# Patient Record
Sex: FEMALE | Race: WHITE | Employment: FULL TIME | URBAN - METROPOLITAN AREA
[De-identification: names, ages, dates, MRNs, and addresses within clinical notes are randomized per-mention and may not be internally consistent; named-entity substitution may affect disease eponyms.]

---

## 2022-01-12 ENCOUNTER — HOSPITAL ENCOUNTER (EMERGENCY)
Age: 47
Discharge: HOME | End: 2022-01-12
Attending: EMERGENCY MEDICINE
Payer: COMMERCIAL

## 2022-01-12 ENCOUNTER — APPOINTMENT (OUTPATIENT)
Dept: GENERAL RADIOLOGY | Age: 47
End: 2022-01-12
Payer: COMMERCIAL

## 2022-01-12 VITALS
SYSTOLIC BLOOD PRESSURE: 145 MMHG | WEIGHT: 165 LBS | HEART RATE: 94 BPM | RESPIRATION RATE: 18 BRPM | BODY MASS INDEX: 28.17 KG/M2 | TEMPERATURE: 98.4 F | DIASTOLIC BLOOD PRESSURE: 89 MMHG | OXYGEN SATURATION: 99 % | HEIGHT: 64 IN

## 2022-01-12 DIAGNOSIS — R52 PAIN: ICD-10-CM

## 2022-01-12 DIAGNOSIS — S43.014A ANTERIOR DISLOCATION OF SHOULDER, RIGHT, INITIAL ENCOUNTER: Primary | ICD-10-CM

## 2022-01-12 PROCEDURE — 96374 THER/PROPH/DIAG INJ IV PUSH: CPT

## 2022-01-12 PROCEDURE — 99285 EMERGENCY DEPT VISIT HI MDM: CPT

## 2022-01-12 PROCEDURE — 73030 X-RAY EXAM OF SHOULDER: CPT

## 2022-01-12 PROCEDURE — 23650 CLTX SHO DSLC W/MNPJ WO ANES: CPT

## 2022-01-12 PROCEDURE — 2500000003 HC RX 250 WO HCPCS: Performed by: STUDENT IN AN ORGANIZED HEALTH CARE EDUCATION/TRAINING PROGRAM

## 2022-01-12 PROCEDURE — 6370000000 HC RX 637 (ALT 250 FOR IP): Performed by: PHYSICIAN ASSISTANT

## 2022-01-12 PROCEDURE — 2580000003 HC RX 258: Performed by: STUDENT IN AN ORGANIZED HEALTH CARE EDUCATION/TRAINING PROGRAM

## 2022-01-12 RX ORDER — HYDROCODONE BITARTRATE AND ACETAMINOPHEN 5; 325 MG/1; MG/1
1 TABLET ORAL ONCE
Status: COMPLETED | OUTPATIENT
Start: 2022-01-12 | End: 2022-01-12

## 2022-01-12 RX ORDER — KETAMINE HYDROCHLORIDE 10 MG/ML
1.5 INJECTION, SOLUTION INTRAMUSCULAR; INTRAVENOUS ONCE
Status: COMPLETED | OUTPATIENT
Start: 2022-01-12 | End: 2022-01-12

## 2022-01-12 RX ORDER — HYDROCODONE BITARTRATE AND ACETAMINOPHEN 5; 325 MG/1; MG/1
1 TABLET ORAL EVERY 8 HOURS PRN
Qty: 9 TABLET | Refills: 0 | Status: SHIPPED | OUTPATIENT
Start: 2022-01-12 | End: 2022-01-15

## 2022-01-12 RX ORDER — 0.9 % SODIUM CHLORIDE 0.9 %
1000 INTRAVENOUS SOLUTION INTRAVENOUS ONCE
Status: COMPLETED | OUTPATIENT
Start: 2022-01-12 | End: 2022-01-12

## 2022-01-12 RX ORDER — ONDANSETRON 4 MG/1
4 TABLET, ORALLY DISINTEGRATING ORAL ONCE
Status: COMPLETED | OUTPATIENT
Start: 2022-01-12 | End: 2022-01-12

## 2022-01-12 RX ADMIN — SODIUM CHLORIDE 1000 ML: 9 INJECTION, SOLUTION INTRAVENOUS at 12:43

## 2022-01-12 RX ADMIN — ONDANSETRON 4 MG: 4 TABLET, ORALLY DISINTEGRATING ORAL at 11:05

## 2022-01-12 RX ADMIN — HYDROCODONE BITARTRATE AND ACETAMINOPHEN 1 TABLET: 5; 325 TABLET ORAL at 11:05

## 2022-01-12 RX ADMIN — KETAMINE HYDROCHLORIDE 112.2 MG: 10 INJECTION, SOLUTION INTRAMUSCULAR; INTRAVENOUS at 12:42

## 2022-01-12 ASSESSMENT — PAIN DESCRIPTION - ORIENTATION
ORIENTATION: RIGHT

## 2022-01-12 ASSESSMENT — PAIN DESCRIPTION - LOCATION
LOCATION: ARM
LOCATION: SHOULDER
LOCATION: SHOULDER

## 2022-01-12 ASSESSMENT — ENCOUNTER SYMPTOMS
EYE REDNESS: 0
VOMITING: 0
EYE DISCHARGE: 0
SORE THROAT: 0
SINUS PRESSURE: 0
BACK PAIN: 0
COUGH: 0
WHEEZING: 0
ABDOMINAL DISTENTION: 0
NAUSEA: 0
DIARRHEA: 0
SHORTNESS OF BREATH: 0
EYE PAIN: 0

## 2022-01-12 ASSESSMENT — PAIN DESCRIPTION - DESCRIPTORS
DESCRIPTORS: ACHING
DESCRIPTORS: ACHING
DESCRIPTORS: SHARP

## 2022-01-12 ASSESSMENT — PAIN DESCRIPTION - ONSET
ONSET: SUDDEN
ONSET: SUDDEN

## 2022-01-12 ASSESSMENT — PAIN DESCRIPTION - FREQUENCY
FREQUENCY: CONTINUOUS
FREQUENCY: CONTINUOUS
FREQUENCY: INTERMITTENT

## 2022-01-12 ASSESSMENT — PAIN SCALES - GENERAL
PAINLEVEL_OUTOF10: 0
PAINLEVEL_OUTOF10: 10
PAINLEVEL_OUTOF10: 0
PAINLEVEL_OUTOF10: 8

## 2022-01-12 ASSESSMENT — PAIN DESCRIPTION - PAIN TYPE
TYPE: ACUTE PAIN
TYPE: ACUTE PAIN

## 2022-01-12 ASSESSMENT — PAIN DESCRIPTION - PROGRESSION: CLINICAL_PROGRESSION: NOT CHANGED

## 2022-01-12 NOTE — ED NOTES
Bed: 20  Expected date:   Expected time:   Means of arrival:   Comments:  Super track     Loan Prakash RN  01/12/22 [id number]

## 2022-01-12 NOTE — ED TRIAGE NOTES
Department of Emergency Medicine  FIRST PROVIDER TRIAGE NOTE             Independent MLP           1/12/22  10:42 AM EST    Date of Encounter: 1/12/22   MRN: [id number]      HPI: Nai Marc is a 48 y.o. female who presents to the ED for Fall (Stepped on dog toy and fell with hand held out. Pain in right shoulder and elbow, denies hitting head or LOC)   right shoulder/elbow upper arm pain after fall today. Fell onto arm (NOT outstretched). ROS: Negative for cp, sob or headache. PE: Gen Appearance/Constitutional: alert  CV: regular rate  Pulm: CTA bilat     Initial Plan of Care: All treatment areas with department are currently occupied. Plan to order/Initiate the following while awaiting opening in ED: imaging studies.     Initial Plan of Care: Initiate Treatment-Testing, Proceed toTreatment Area When Bed Available for ED Attending/MLP to Continue Care    Electronically signed by ANGEL Arias   DD: 1/12/22

## 2022-01-12 NOTE — ED PROVIDER NOTES
ED Attending  CC: Katherin Waite [id number]  Department of Emergency Medicine   ED  Encounter Note  Admit Date/RoomTime: 2022 10:59 AM  ED Room:     NAME: Nellene Kayser  : 1975  MRN: [id number]     Chief Complaint:  Fall (Stepped on dog toy and fell with hand held out. Pain in right shoulder and elbow, denies hitting head or LOC)    History of Present Illness       Court Yury Cee is a 48 y.o. old female who presents to the emergency department by private vehicle, for right shoulder pain which occurred after a mechanical fall at 0900 this morning. Pt states she stepped on a dog toy which caused her to fall onto her right shoulder. Pt denies hitting her head, loc, or any other injury during the fall. Pt states her symptoms are mild in severity and describes it as an aching, sharp pain. Pt states the pain is worse when she tries to move it. Denies anything making it better. Pt is right hand dominant and denies previous injury to the shoulder. Denies fever/chills, HA, vision change, dizziness, cp, sob, abdominal pain, nvd, numbness/weakness. ROS   Pertinent positives and negatives are stated within HPI, all other systems reviewed and are negative. Past Medical History:  has no past medical history on file. Surgical History:  has no past surgical history on file. Social History:  reports that she has never smoked. She has never used smokeless tobacco.    Family History: family history is not on file. Allergies: Patient has no known allergies. Physical Exam   Oxygen Saturation Interpretation: Normal.        ED Triage Vitals   BP Temp Temp Source Pulse Resp SpO2 Height Weight   22 1046 22 1056 22 1056 22 1036 22 1046 22 1036 22 1056 22 1056   (!) 157/128 98.4 °F (36.9 °C) Oral 102 18 99 % 5' 4\" (1.626 m) 165 lb (74.8 kg)         Constitutional:  Alert, development consistent with age. Neck:  Normal ROM. Supple. Non-tender. Right Shoulder: anterior, posterior, lateral.              Tenderness: mild              Swelling: None. Deformity: dislocation deformity palpated. ROM: unable to test due to pain. FROM of digits. Skin:  no wounds, erythema, or swelling. Neurovascular: Motor deficit: none. Sensory deficit: none. Pulse deficit: none. Capillary refill: normal.    Right Elbow: diffusely across elbow            Tenderness:  none. Swelling: None. Deformity: no deformity observed/palpated. ROM: full painless ROM. Skin:  no wounds, erythema, or swelling. Lymphatics: No lymphangitis or edema noted  Neurological:  Oriented. Motor functions intact. Lab / Imaging Results   (All laboratory and radiology results have been personally reviewed by myself)  Labs:  No results found for this visit on 01/12/22. Imaging: All Radiology results interpreted by Radiologist unless otherwise noted. XR SHOULDER RIGHT (MIN 2 VIEWS)   Final Result   Anterior shoulder dislocation. ED Course / Medical Decision Making     Medications   0.9 % sodium chloride bolus (has no administration in time range)   ketamine (KETALAR) injection 112.2 mg (has no administration in time range)   ondansetron (ZOFRAN-ODT) disintegrating tablet 4 mg (4 mg Oral Given 1/12/22 1105)   HYDROcodone-acetaminophen (NORCO) 5-325 MG per tablet 1 tablet (1 tablet Oral Given 1/12/22 1105)       Consult(s):   None    Procedure(s):   See physician note. MDM:      Pt presenting after fall with anterior shoulder dislocation. Pt is in no acute distress, afebrile, nontoxic in appearance. Pt xray showing anterior dislocation. See separate procedure note.      Plan of Care/Counseling:  DEVANG Mathur reviewed today's visit with the patient in addition to providing specific details for the plan of care and counseling regarding the diagnosis and prognosis. Questions are answered at this time and are agreeable with the plan. Assessment      1. Anterior dislocation of shoulder, right, initial encounter      Plan   Discharged home. Patient condition is stable    New Medications     New Prescriptions    No medications on file     Electronically signed by Carlton Bermudez PA-C   DD: 1/12/22  **This report was transcribed using voice recognition software. Every effort was made to ensure accuracy; however, inadvertent computerized transcription errors may be present.   END OF ED PROVIDER NOTE        Carlton Bermudez PA-C  01/12/22 1195

## 2022-01-12 NOTE — ED PROVIDER NOTES
Chief Complaint   Patient presents with    Fall     Stepped on dog toy and fell with hand held out. Pain in right shoulder and elbow, denies hitting head or LOC       Patient is a 78-year-old female comes in after ground-level fall, she states she tripped on a dog toy falling on her right side. She does have pain to the right shoulder. She has decreased range of motion. Symptoms have been persistent and moderate in nature, are worsened with movement and relieved with rest.  She is returning medication at home for the symptoms. She denies hitting her head or loss of consciousness. She is alert and oriented. No history of previous fracture or injury to this area. The history is provided by the patient. No  was used. Review of Systems   Constitutional: Negative for chills and fever. HENT: Negative for ear pain, sinus pressure and sore throat. Eyes: Negative for pain, discharge and redness. Respiratory: Negative for cough, shortness of breath and wheezing. Cardiovascular: Negative for chest pain. Gastrointestinal: Negative for abdominal distention, diarrhea, nausea and vomiting. Genitourinary: Negative for dysuria and frequency. Musculoskeletal: Positive for arthralgias. Negative for back pain. Skin: Negative for rash and wound. Neurological: Negative for weakness and headaches. Hematological: Negative for adenopathy. All other systems reviewed and are negative. Physical Exam  Vitals and nursing note reviewed. Constitutional:       General: She is not in acute distress. Appearance: Normal appearance. She is well-developed. She is not ill-appearing. HENT:      Head: Normocephalic and atraumatic. Eyes:      Pupils: Pupils are equal, round, and reactive to light. Cardiovascular:      Rate and Rhythm: Normal rate and regular rhythm. Pulses: Normal pulses. Heart sounds: Normal heart sounds.       Comments: Radial pulses 2+ and strong in the upper extremities bilaterally  Pulmonary:      Effort: Pulmonary effort is normal. No respiratory distress. Breath sounds: Normal breath sounds. No wheezing or rales. Abdominal:      General: Bowel sounds are normal.      Palpations: Abdomen is soft. Tenderness: There is no abdominal tenderness. There is no guarding or rebound. Musculoskeletal:         General: Tenderness and deformity present. Cervical back: Normal range of motion and neck supple. Right lower leg: No edema. Left lower leg: No edema. Comments: Tenderness palpation of the right shoulder, obvious deformity, no skin tenting   Skin:     General: Skin is warm and dry. Capillary Refill: Capillary refill takes less than 2 seconds. Neurological:      General: No focal deficit present. Mental Status: She is alert and oriented to person, place, and time. Cranial Nerves: No cranial nerve deficit. Coordination: Coordination normal.      Comments: Sensation and muscle strength intact in the upper extremities bilaterally          Procedures     -------------------------------- Conscious Sedation Procedure Note -----------------------------  Patient Name: Santo Gibson   Medical Record Number: [id number]  Date: 1/12/22   Time: 12:11 PM EST   Room/Bed: 20/20    Start time:  12:35PM  Indication: shoulder dislocation  Consent: I have discussed with the patient and/or the patient representative the indication, alternatives, and the possible risks and/or complications of the planned procedure and the anesthesia methods. The patient and/or patient representative appear to understand and agree to proceed. Physician Involvement: The attending physician was present and supervising this procedure. 1/12/22     Time: 1235 (pre-procedure start time)  Pre-Sedation Documentation and Exam: I have personally completed a history, physical exam & review of systems for this patient (see notes).   Airway Assessment: Mallampati Class I - (soft palate, fauces, uvula & anterior/posterior tonsillar pillars are visible)  Prior History of Anesthesia Complications: none  ASA Classification: Class 1 - A normal healthy patient  Sedation/ Anesthesia Plan: intravenous sedation  Medications Used: ketamine intravenously  Monitoring and Safety: The patient was placed on a cardiac monitor and vital signs, pulse oximetry and level of consciousness were continuously evaluated throughout the procedure. The patient was closely monitored until recovery from the medications was complete and the patient had returned to baseline status. Respiratory therapy was on standby at all times during the procedure.    ----------------------------------- Post Conscious Sedation Note -----------------------------------    1/12/22     Time: 1245 (post-procedure stop time)  Post-Sedation Vital Signs: Vital signs were reviewed and were stable after the procedure (see flow sheet for vitals)       Post-Sedation Exam: Lungs: clear to auscultation bilaterally and Cardiovascular: regular rate and rhythm       Complications: none    Electronically Signed by: Elham Barrientos, DO       Joint Reduction Procedure Note    Indication: Joint dislocation    Consent: The patient was counseled regarding the procedure, it's indications, risks, potential complications and alternatives and any questions were answered. Consent was obtained. Procedure: The pre-reduction exam showed distal perfusion & neurologic function to be normal. The patient was placed in the appropriate position. Anesthesia/pain control was obtained using conscious sedation -SEE CONSCIOUS SEDATION NOTE FOR DETAILS. Reduction of the right shoulder was performed by direct traction and traction and counter traction. Post reduction films were obtained and revealed satisfactory reduction.  A post-reduction exam revealed distal perfusion & neurologic function to be normal. The affected area was immobilized with a sling and lyric.    The patient tolerated the procedure well. Complications: None      Labs Reviewed - No data to display  XR SHOULDER RIGHT (MIN 2 VIEWS)   Final Result   No acute abnormality. XR SHOULDER RIGHT (MIN 2 VIEWS)   Final Result   Anterior shoulder dislocation. MDM  Number of Diagnoses or Management Options  Anterior dislocation of shoulder, right, initial encounter  Diagnosis management comments: Patient is a 24-year-old female presents today after ground-level fall complaining of right shoulder pain. She has no neurologic deficits on exam, has obvious deformity. X-ray shows anterior shoulder dislocation. Patient did receive ketamine for conscious sedation, joint reduction was performed. She was placed in a sling. After exam she is neurovascularly intact, x-ray does show appropriate adjustment. She was observed in the department. With improvement of symptoms she will be discharged home with pain medication and instructed to follow-up with orthopedic specialist.  Return precautions given. She was discharged home in sling. Amount and/or Complexity of Data Reviewed  Tests in the radiology section of CPT®: reviewed                --------------------------------------------- PAST HISTORY ---------------------------------------------  Past Medical History:  has no past medical history on file. Past Surgical History:  has no past surgical history on file. Social History:  reports that she has never smoked. She has never used smokeless tobacco.    Family History: family history is not on file. The patients home medications have been reviewed. Allergies: Patient has no known allergies. -------------------------------------------------- RESULTS -------------------------------------------------  Labs:  No results found for this visit on 01/12/22. Radiology:  XR SHOULDER RIGHT (MIN 2 VIEWS)   Final Result   No acute abnormality.          XR SHOULDER RIGHT (MIN 2 VIEWS) Final Result   Anterior shoulder dislocation.             ------------------------- NURSING NOTES AND VITALS REVIEWED ---------------------------  Date / Time Roomed:  1/12/2022 10:59 AM  ED Bed Assignment:  20/20    The nursing notes within the ED encounter and vital signs as below have been reviewed. BP (!) 150/117   Pulse 102   Temp 98.4 °F (36.9 °C) (Oral)   Resp 18   Ht 5' 4\" (1.626 m)   Wt 165 lb (74.8 kg)   SpO2 100%   BMI 28.32 kg/m²   Oxygen Saturation Interpretation: Normal      ------------------------------------------ PROGRESS NOTES ------------------------------------------  I have spoken with the patient and discussed todays results, in addition to providing specific details for the plan of care and counseling regarding the diagnosis and prognosis. Their questions are answered at this time and they are agreeable with the plan. I discussed at length with them reasons for immediate return here for re evaluation. They will followup with primary care by calling their office tomorrow. --------------------------------- ADDITIONAL PROVIDER NOTES ---------------------------------  At this time the patient is without objective evidence of an acute process requiring hospitalization or inpatient management. They have remained hemodynamically stable throughout their entire ED visit and are stable for discharge with outpatient follow-up. The plan has been discussed in detail and they are aware of the specific conditions for emergent return, as well as the importance of follow-up. New Prescriptions    HYDROCODONE-ACETAMINOPHEN (NORCO) 5-325 MG PER TABLET    Take 1 tablet by mouth every 8 hours as needed for Pain for up to 3 days. Intended supply: 3 days. Take lowest dose possible to manage pain       Diagnosis:  1. Anterior dislocation of shoulder, right, initial encounter    2. Pain        Disposition:  Patient's disposition: Discharge to home  Patient's condition is stable. Valerie Leonard DO  Resident  01/12/22 4716

## 2022-01-13 ENCOUNTER — TELEPHONE (OUTPATIENT)
Dept: ORTHOPEDIC SURGERY | Age: 47
End: 2022-01-13

## 2022-01-13 NOTE — TELEPHONE ENCOUNTER
ED 1/13/22 for Anterior dislocation of shoulder, right, initial encounter. Please advise patient for follow up appointment      MDM:      Pt presenting after fall with anterior shoulder dislocation. Pt is in no acute distress, afebrile, nontoxic in appearance. Pt xray showing anterior dislocation. See separate procedure note. Imaging: All Radiology results interpreted by Radiologist unless otherwise noted. XR SHOULDER RIGHT (MIN 2 VIEWS)   Final Result   Anterior shoulder dislocation. Neck:  Normal ROM. Supple. Non-tender. Right Shoulder: anterior, posterior, lateral.              Tenderness: mild              Swelling: None. Deformity: dislocation deformity palpated. ROM: unable to test due to pain. FROM of digits. Skin:  no wounds, erythema, or swelling. Neurovascular: Motor deficit: none. Sensory deficit: none. Pulse deficit: none. Capillary refill: normal.    Right Elbow: diffusely across elbow            Tenderness:  none. Swelling: None. Deformity: no deformity observed/palpated. ROM: full painless ROM. Skin:  no wounds, erythema, or swelling. Lymphatics: No lymphangitis or edema noted  Neurological:  Oriented.   Motor functions intact    ED Course / Medical Decision Making      Medications   0.9 % sodium chloride bolus (has no administration in time range)   ketamine (KETALAR) injection 112.2 mg (has no administration in time range)   ondansetron (ZOFRAN-ODT) disintegrating tablet 4 mg (4 mg Oral Given 1/12/22 1105)   HYDROcodone-acetaminophen (NORCO) 5-325 MG per tablet 1 tablet (1 tablet Oral Given 1/12/22 1105)

## 2022-01-13 NOTE — TELEPHONE ENCOUNTER
We can see next week with TS.  Will repeat XR  Electronically signed by Mandy Kyle PA-C on 1/13/2022 at 11:27 AM

## 2022-01-13 NOTE — TELEPHONE ENCOUNTER
Call placed to patient, VM left, appointment tentatively set.     Future Appointments   Date Time Provider Gio Maegan   1/19/2022  2:15 PM Milagros Hernandez MD  [facility]

## 2022-01-18 DIAGNOSIS — S43.004A DISLOCATION OF RIGHT SHOULDER JOINT, INITIAL ENCOUNTER: Primary | ICD-10-CM

## 2022-01-19 ENCOUNTER — HOSPITAL ENCOUNTER (OUTPATIENT)
Dept: GENERAL RADIOLOGY | Age: 47
Discharge: HOME | End: 2022-01-21
Payer: COMMERCIAL

## 2022-01-19 ENCOUNTER — OFFICE VISIT (OUTPATIENT)
Dept: ORTHOPEDIC SURGERY | Age: 47
End: 2022-01-19
Payer: COMMERCIAL

## 2022-01-19 VITALS — TEMPERATURE: 98.1 F

## 2022-01-19 DIAGNOSIS — S43.004A DISLOCATION OF RIGHT SHOULDER JOINT, INITIAL ENCOUNTER: ICD-10-CM

## 2022-01-19 DIAGNOSIS — S43.004A DISLOCATION OF RIGHT SHOULDER JOINT, INITIAL ENCOUNTER: Primary | ICD-10-CM

## 2022-01-19 PROCEDURE — 99203 OFFICE O/P NEW LOW 30 MIN: CPT | Performed by: PHYSICIAN ASSISTANT

## 2022-01-19 PROCEDURE — 1036F TOBACCO NON-USER: CPT | Performed by: PHYSICIAN ASSISTANT

## 2022-01-19 PROCEDURE — 99202 OFFICE O/P NEW SF 15 MIN: CPT | Performed by: PHYSICIAN ASSISTANT

## 2022-01-19 PROCEDURE — G8419 CALC BMI OUT NRM PARAM NOF/U: HCPCS | Performed by: PHYSICIAN ASSISTANT

## 2022-01-19 PROCEDURE — 73030 X-RAY EXAM OF SHOULDER: CPT

## 2022-01-19 PROCEDURE — G8484 FLU IMMUNIZE NO ADMIN: HCPCS | Performed by: PHYSICIAN ASSISTANT

## 2022-01-19 PROCEDURE — G8427 DOCREV CUR MEDS BY ELIG CLIN: HCPCS | Performed by: PHYSICIAN ASSISTANT

## 2022-01-19 RX ORDER — DESVENLAFAXINE 50 MG/1
50 TABLET, EXTENDED RELEASE ORAL DAILY
COMMUNITY
End: 2022-03-23 | Stop reason: DRUGHIGH

## 2022-01-19 NOTE — PROGRESS NOTES
Patient presents today for ED f/u 1/12, Right Anterior shoulder dislocation. Patient states she is having very minimal pain, not wearing sling. Only taking OTC pain medications for relief. Patient is a  and is planning to return to work tomorrow 1- and would like a letter stating any restrictions or light duty she could do. Patient would like to know of any long term issues from this injury.

## 2022-01-19 NOTE — LETTER
[address]  [address]  [place] [address]  Phone: [phone]  Fax: [phone]    Lucero Jorgensen PA-C        January 19, 2022     Patient: Catarina Koch   YOB: 1975   Date of Visit: 1/19/2022       To Whom It May Concern: It is my medical opinion that Catarina Koch may return to work on 1/20/2022 with the following restrictions: lifting/carrying not to exceed 5 lbs. , pushing/pulling not to exceed 5 lbs. Restrictions apply to the right upper extremity. She will be reevaluated in office and restrictions advanced upon reevaluation. If you have any questions or concerns, please don't hesitate to call.     Sincerely,        Lucero Jorgensen PA-C

## 2022-01-19 NOTE — PROGRESS NOTES
New Patient Orthopaedic Progress Note    Ralph Zepeda is a 48 y.o. female, her YOB: 1975 with the following history as recorded in [facility]: There are no problems to display for this patient. Current Outpatient Medications   Medication Sig Dispense Refill    desvenlafaxine succinate (PRISTIQ) 50 MG TB24 extended release tablet Take 50 mg by mouth daily       No current facility-administered medications for this visit. Allergies: Patient has no known allergies. No past medical history on file. No past surgical history on file. No family history on file. Social History     Tobacco Use    Smoking status: Never Smoker    Smokeless tobacco: Never Used   Substance Use Topics    Alcohol use: Not on file                             Chief Complaint   Patient presents with    Follow-Up from [facility]     ED f/u 1/12, Right Anterior shoulder dislocation       SUBJECTIVE: Shoulder Pain  Patient complains of right side shoulder pain. The symptoms began 1/12/22, patient sustained an anterior shoulder dislocation after falling down several stairs at home. Patient is RHD and works as a . Symptoms have gradually improved. Pain is described as a deep ache worse with attempted movements overhead. Pain controlled with NSAIDs, has been attempting A/PROM exercises including pendulums to prevent stiffness. She denies prior issues with her right shoulder, she is planning to RTW tomorrow modified duty, patient denies N/T to the [address] [place]. Review of Systems   Constitutional: Negative for fever, chills, diaphoresis, appetite change and fatigue. HENT: Negative for dental issues, hearing loss and tinnitus. Negative for congestion, sinus pressure, sneezing, sore throat. Negative for headache. Eyes: Negative for visual disturbance, blurred and double vision. Negative for pain, discharge, redness and itching  Respiratory: Negative for cough, shortness of breath and wheezing.    Cardiovascular: Negative for chest pain, palpitations and leg swelling. No dyspnea on exertion   Gastrointestinal:   Negative for nausea, vomiting, abdominal pain, diarrhea, constipation  or black or bloody. Hematologic\Lymphatic:  negative for bleeding, petechiae,   Genitourinary: Negative for hematuria and difficulty urinating. Musculoskeletal: Negative for neck pain and stiffness. Negative for back pain, see HPI  Skin: Negative for pallor, rash and wound. Neurological: Negative for dizziness, tremors, seizures, weakness, light-headedness, no TIA or stroke symptoms. No numbness and headaches. Psychiatric/Behavioral: Negative. OBJECTIVE:      Physical Examination:   General appearance: alert, well appearing, and in no distress,  normal appearing weight. No visible signs of trauma   Mental status: alert, oriented to person, place, and time, normal mood, behavior, speech, dress, motor activity, and thought processes  Abdomen: soft, nondistended  Resp:   resp easy and unlabored, no audible wheezes note, normal symmetrical expansion of both hemithoraces  Cardiac: distal pulses palpable, skin and extremities well perfused  Neurological: alert, oriented X3, normal speech, no focal findings or movement disorder noted, motor and sensory grossly normal bilaterally, normal muscle tone, no tremors, strength 5/5, normal gait and station  HEENT: normochephalic atraumatic, external ears and eyes normal, sclera normal, neck supple, no nasal discharge.    Extremities:   peripheral pulses normal, no edema, redness or tenderness in the calves   Skin: normal coloration, no rashes or open wounds, no suspicious skin lesions noted  Psych: Affect euthymic   Musculoskeletal:   Extremity:  right Upper Extremity  ·  Skin intact circumferentially  · +TTP Anterolateral shoulder  ·  Compartments soft and compressible  · +AIN/PIN/Ulnar nerve function intact grossly  · +Radial pulse, Brisk Cap refill, hand warm and perfused  · Sensation intact to touch in radial/ulnar/median nerve distributions to hand  · AROM of the shoulder comfortably to the horizon without apprehension  · Discomfort with ER with elbow at her side  · + pain with AC crossover  · Static rotator cuff strength intact        Temp 98.1 °F (36.7 °C) (Oral)      XR: 1/19/22 3V of the R shoulder demonstrates no acute fracture or dislocation, humeral head concentrically located in the glenoid. ASSESSMENT:     Diagnosis Orders   1. Dislocation of right shoulder joint, initial encounter  Amb External Referral To Physical Therapy       Discussion: Had lengthy discussion with patient regarding Her diagnosis, typical prognosis, and expected outcomes. I reviewed the possible complications from the injury itself despite treatment choosen. Discussed progression of evaluation if symptoms not improving as well as long term sequela    PLAN:  OK to return to work with limitations as far as no lifting/pushing/pulling greater than 5 lbs  Start outpatient PT  [address]  PHONE: [phone]    Follow up in office in 2-3 weeks  If not improving, as discussed would recommend MRI of the right shoulder      Electronically signed by Darcy Ayon PA-C on 1/19/2022 at 3:39 PM  Note: This report was completed using computerTelarix voiced recognition software. Every effort has been made to ensure accuracy; however, inadvertent computerized transcription errors may be present.

## 2022-02-09 ENCOUNTER — OFFICE VISIT (OUTPATIENT)
Dept: ORTHOPEDIC SURGERY | Age: 47
End: 2022-02-09
Payer: COMMERCIAL

## 2022-02-09 VITALS — TEMPERATURE: 97.5 F

## 2022-02-09 DIAGNOSIS — S43.004D DISLOCATION OF RIGHT SHOULDER JOINT, SUBSEQUENT ENCOUNTER: Primary | ICD-10-CM

## 2022-02-09 PROCEDURE — G8484 FLU IMMUNIZE NO ADMIN: HCPCS | Performed by: ORTHOPAEDIC SURGERY

## 2022-02-09 PROCEDURE — 1036F TOBACCO NON-USER: CPT | Performed by: ORTHOPAEDIC SURGERY

## 2022-02-09 PROCEDURE — 99213 OFFICE O/P EST LOW 20 MIN: CPT | Performed by: ORTHOPAEDIC SURGERY

## 2022-02-09 PROCEDURE — G8427 DOCREV CUR MEDS BY ELIG CLIN: HCPCS | Performed by: ORTHOPAEDIC SURGERY

## 2022-02-09 PROCEDURE — 99212 OFFICE O/P EST SF 10 MIN: CPT

## 2022-02-09 PROCEDURE — G8419 CALC BMI OUT NRM PARAM NOF/U: HCPCS | Performed by: ORTHOPAEDIC SURGERY

## 2022-02-09 NOTE — PROGRESS NOTES
Orthopaedic Clinic Note     S: Denita Carmichael is a 48 y.o. who is here today for her week ED follow-up after close right shoulder dislocation. States she has been doing well, does have some pain working with physical therapy and when sleeping on her right side. She is not had any numbness, tingling, weakness and has no other complaints. 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 There is no problem list on file for this patient. Physical Exam    Temp 97.5 °F (36.4 °C) (Oral)     O: Alert and oriented X 3, no acute distress, respirations easy and unlabored with no audible wheezes, skin warm and dry, speech and dress appropriate for noted age, affect euthymic. Right upper extremity:  · Skin intact circumferentially  · Compartments of the arm soft and compressible  · Sensation intact light touch in radial, ulnar, median, axillary distributions  · Motor intact, 5/5 AIN/PIN/ulnar/ axillary  · Patient does have some pain with range of motion at the extremes of abduction and external rotation as well as internal rotation, some apprehension but does not feel like she will dislocate  · Painless active abduction to 90 degrees    A:     ICD-10-CM    1. Dislocation of right shoulder joint, subsequent encounter  S43.004D        P: Continue physical therapy  Follow-up in 1 month if symptoms persist  Can start gradual return to full duty at work as tolerated    Electronically Signed By  Ish Ocampo DO    NOTE: This report was transcribed using voice recognition software. Every effort was made to ensure accuracy; however, inadvertent computerized transcription errors may be present    Patient physically seen and examined today. Patient doing well with her rehabilitation. Patient is regaining range of motion. Does still have slight apprehension with flexion external rotation.   Otherwise pain is controlled. She is doing well with restrictions at work. We talked in detail today. Also talked about the potential rotator cuff injury down the road. She understands this. She is going to continue work in therapy so see us back in 4 to 6 weeks. That point time we will get another x-ray and see if she is doing clinically. She is agreeable with the plan. She was told to call sooner with any questions or concerns.

## 2022-02-09 NOTE — PROGRESS NOTES
Patient presents today for her 3 wk f/u (ED  1/12/22), Right Anterior shoulder dislocation; TTS      She states she is having very minimal pain, mostly pain occurs after PT, patient is in PT 1-2 times a week. Patient does state she is unable to sleep on the right shoulder. Patient has no other concerns or questions at this time.

## 2022-03-23 ENCOUNTER — HOSPITAL ENCOUNTER (OUTPATIENT)
Dept: GENERAL RADIOLOGY | Age: 47
Discharge: HOME | End: 2022-03-25
Payer: COMMERCIAL

## 2022-03-23 ENCOUNTER — OFFICE VISIT (OUTPATIENT)
Dept: ORTHOPEDIC SURGERY | Age: 47
End: 2022-03-23
Payer: COMMERCIAL

## 2022-03-23 DIAGNOSIS — S43.004D DISLOCATION OF RIGHT SHOULDER JOINT, SUBSEQUENT ENCOUNTER: ICD-10-CM

## 2022-03-23 DIAGNOSIS — S43.004D DISLOCATION OF RIGHT SHOULDER JOINT, SUBSEQUENT ENCOUNTER: Primary | ICD-10-CM

## 2022-03-23 PROBLEM — S43.004A DISLOCATION OF RIGHT SHOULDER JOINT: Status: ACTIVE | Noted: 2022-03-23

## 2022-03-23 PROCEDURE — 73030 X-RAY EXAM OF SHOULDER: CPT

## 2022-03-23 PROCEDURE — 99212 OFFICE O/P EST SF 10 MIN: CPT

## 2022-03-23 PROCEDURE — G8427 DOCREV CUR MEDS BY ELIG CLIN: HCPCS | Performed by: ORTHOPAEDIC SURGERY

## 2022-03-23 PROCEDURE — G8484 FLU IMMUNIZE NO ADMIN: HCPCS | Performed by: ORTHOPAEDIC SURGERY

## 2022-03-23 PROCEDURE — G8419 CALC BMI OUT NRM PARAM NOF/U: HCPCS | Performed by: ORTHOPAEDIC SURGERY

## 2022-03-23 PROCEDURE — 1036F TOBACCO NON-USER: CPT | Performed by: ORTHOPAEDIC SURGERY

## 2022-03-23 PROCEDURE — 99213 OFFICE O/P EST LOW 20 MIN: CPT | Performed by: ORTHOPAEDIC SURGERY

## 2022-03-23 RX ORDER — DESVENLAFAXINE 100 MG/1
TABLET, EXTENDED RELEASE ORAL
COMMUNITY
Start: 2022-02-26

## 2022-03-23 RX ORDER — ESTRADIOL AND NORETHINDRONE ACETATE .5; .1 MG/1; MG/1
TABLET ORAL
COMMUNITY
Start: 2022-03-16

## 2022-03-23 NOTE — PATIENT INSTRUCTIONS
He does tolerate    Follow-up on an as-needed basis    Call with any questions, concerns, or need for reevaluation

## 2022-03-23 NOTE — PROGRESS NOTES
Chief Complaint   Patient presents with    Shoulder Pain     Right anterior shoulder dislocation 1/12/2022       SUBJECTIVE: Patient is a very pleasant 18-year-old female follows up for right shoulder dislocation today. Original injury is on January 12, 2022. She is doing very well. She has been discharged from physical therapy. She is doing all her tasks at work with no pain. She denies any pain whatsoever. She states she is pre much back to her regular life. She denies any recent injuries or falls    Past Medical History:   Diagnosis Date    Dislocation of right shoulder joint 3/23/2022     History reviewed. No pertinent surgical history. History reviewed. No pertinent family history. Social History     Tobacco Use    Smoking status: Never Smoker    Smokeless tobacco: Never Used   Substance Use Topics    Alcohol use: Not on file    Drug use: Not on file     No Known Allergies      Review of Systems -   General ROS: negative for - chills, fatigue, fever or night sweats  Respiratory ROS: no cough, shortness of breath, or wheezing  Cardiovascular ROS: no chest pain or dyspnea on exertion  Gastrointestinal ROS: no abdominal pain, change in bowel habits, or black or bloody stools  Genitourinary: no hematuria, dysuria, or incontinence   Musculoskeletal ROS:see above  Neurological ROS: no TIA or stroke symptoms       OBJECTIVE:   Alert and oriented X 3, no acute distress, respirations easy and unlabored with no audible wheezes, skin warm and dry, speech and dress appropriate for noted age, affect euthymic.     Extremity:  Right upper extremity   Skin intact   Swelling or ecchymosis   To palpation about the shoulder   Range of motion is 130 degrees forward elevation, external rotation 35 degrees, internal rotation to the lower lumbar spine   Compartments soft and compressible   Fires M U R nerves   2/4 radial pulse   Sensation intact   Capillary refill less than 3 seconds        XR: 3/23/22   Right shoulder x-rays today show shoulder concentrically reduced with no obvious pathology noted    There were no vitals taken for this visit. ASSESSMENT:     Diagnosis Orders   1.  Dislocation of right shoulder joint, subsequent encounter         PLAN:    Activity as tolerated    Follow-up on an as-needed basis    Call with any questions or concerns      ELECTRONICALLY signed by:    Medardo Stanley MD  3/23/22

## 2022-03-23 NOTE — PROGRESS NOTES
Janie Aquino is a 50 y.o. female who presents for follow up of right shoulder    SURGEON: Dr. Angelica Ferraro MD  Date of Injury/Surgery: 1/12/2022  Date last seen in office: 2/9/2022    Symptoms: better  New complaints: none    Weightbearing: right upper Weight bearing as tolerated      Assistive device No Device  Participating in therapy (location if yes)?  No, patient discharged from outpatient PT a few weeks ago    Refills Needed: None  Order/Referral Needed: N/A

## 2022-03-30 ENCOUNTER — OFFICE VISIT (OUTPATIENT)
Dept: FAMILY MEDICINE CLINIC | Age: 47
End: 2022-03-30
Payer: COMMERCIAL

## 2022-03-30 VITALS
WEIGHT: 173.4 LBS | OXYGEN SATURATION: 98 % | DIASTOLIC BLOOD PRESSURE: 62 MMHG | SYSTOLIC BLOOD PRESSURE: 128 MMHG | HEART RATE: 87 BPM | BODY MASS INDEX: 29.6 KG/M2 | HEIGHT: 64 IN | TEMPERATURE: 97.3 F

## 2022-03-30 DIAGNOSIS — R09.81 NASAL CONGESTION: ICD-10-CM

## 2022-03-30 DIAGNOSIS — R09.82 POSTNASAL DRIP: ICD-10-CM

## 2022-03-30 DIAGNOSIS — R07.0 PAIN IN THROAT: ICD-10-CM

## 2022-03-30 DIAGNOSIS — J01.90 ACUTE NON-RECURRENT SINUSITIS, UNSPECIFIED LOCATION: Primary | ICD-10-CM

## 2022-03-30 PROCEDURE — 99203 OFFICE O/P NEW LOW 30 MIN: CPT | Performed by: PHYSICIAN ASSISTANT

## 2022-03-30 PROCEDURE — 1036F TOBACCO NON-USER: CPT | Performed by: PHYSICIAN ASSISTANT

## 2022-03-30 PROCEDURE — G8419 CALC BMI OUT NRM PARAM NOF/U: HCPCS | Performed by: PHYSICIAN ASSISTANT

## 2022-03-30 PROCEDURE — G8427 DOCREV CUR MEDS BY ELIG CLIN: HCPCS | Performed by: PHYSICIAN ASSISTANT

## 2022-03-30 PROCEDURE — G8484 FLU IMMUNIZE NO ADMIN: HCPCS | Performed by: PHYSICIAN ASSISTANT

## 2022-03-30 RX ORDER — CEFDINIR 300 MG/1
300 CAPSULE ORAL 2 TIMES DAILY
Qty: 20 CAPSULE | Refills: 0 | Status: SHIPPED | OUTPATIENT
Start: 2022-03-30 | End: 2022-04-09

## 2022-03-30 RX ORDER — PREDNISONE 10 MG/1
TABLET ORAL
Qty: 18 TABLET | Refills: 0 | Status: SHIPPED | OUTPATIENT
Start: 2022-03-30

## 2022-03-30 NOTE — PROGRESS NOTES
3/30/22  Trang Hill : 1975 Sex: female  Age 50 y.o. Subjective:  Chief Complaint   Patient presents with    Nasal Congestion     for 5 days. sinus pressure. has had covid twice, most recent dx in january    Pharyngitis         HPI:   Trang Hill , 50 y.o. female presents to [facility] for evaluation of nasal congestion, drainage, sore throat, headache    HPI  69-year-old female presents to [facility] for evaluation nasal congestion, rhinorrhea, sore throat. The patient said the symptoms ongoing for last 5 days. The patient has no quite a bit of sinus pressure. The patient is been somewhat fatigued and spent most of the day in bed yesterday. The patient has had COVID a couple different times and last was in January. The patient is vaccinated. The patient states that she is really not coughing. She is not having any fevers. She has had some epistaxis associated. But she is not having any hemoptysis. ROS:   Unless otherwise stated in this report the patient's positive and negative responses for review of systems for constitutional, eyes, ENT, cardiovascular, respiratory, gastrointestinal, neurological, , musculoskeletal, and integument systems and related systems to the presenting problem are either stated in the history of present illness or were not pertinent or were negative for the symptoms and/or complaints related to the presenting medical problem. Positives and pertinent negatives as per HPI. All others reviewed and are negative. PMH:     Past Medical History:   Diagnosis Date    Dislocation of right shoulder joint 3/23/2022       History reviewed. No pertinent surgical history. History reviewed. No pertinent family history.     Medications:     Current Outpatient Medications:     cefdinir (OMNICEF) 300 MG capsule, Take 1 capsule by mouth 2 times daily for 10 days, Disp: 20 capsule, Rfl: 0    predniSONE (DELTASONE) 10 MG tablet, 3 tabs once daily for 3 days, 2 tabs once daily for 3 days, 1 tab once daily for 3 days, Disp: 18 tablet, Rfl: 0    Estradiol-Norethindrone Acet 0.5-0.1 MG TABS, TAKE 1 TABLET BY MOUTH EVERY DAY, Disp: , Rfl:     desvenlafaxine succinate (PRISTIQ) 100 MG TB24 extended release tablet, TAKE ONE TABLET BY MOUTH EVERY DAY, Disp: , Rfl:     Loratadine (CLARITIN PO), Take by mouth daily, Disp: , Rfl:     Multiple Vitamin (MULTIVITAMIN ADULT PO), Take by mouth daily, Disp: , Rfl:     TURMERIC PO, Take by mouth daily, Disp: , Rfl:     Allergies:   No Known Allergies    Social History:     Social History     Tobacco Use    Smoking status: Never Smoker    Smokeless tobacco: Never Used   Substance Use Topics    Alcohol use: Not on file    Drug use: Not on file       Patient lives at home. Physical Exam:     Vitals:    03/30/22 1025   BP: 128/62   Pulse: 87   Temp: 97.3 °F (36.3 °C)   SpO2: 98%   Weight: 173 lb 6.4 oz (78.7 kg)   Height: 5' 4\" (1.626 m)       Exam:  Physical Exam  Nurse's notes and vital signs reviewed. The patient is not hypoxic. ? General: Alert, no acute distress, patient resting comfortably Patient is not toxic or lethargic. Skin: Warm, intact, no pallor noted. There is no evidence of rash at this time. Head: Normocephalic, atraumatic  Eye: Normal conjunctiva  Ears, Nose, Throat: Right tympanic membrane clear, left tympanic membrane clear. No drainage or discharge noted. No pre- or post-auricular tenderness, erythema, or swelling noted. Nasal congestion, rhinorrhea  Posterior oropharynx shows erythema, cobblestoning but no evidence of tonsillar hypertrophy, or exudate. the uvula is midline. No trismus or drooling is noted. Moist mucous membranes. Cardiovascular: Regular Rate and Rhythm  Respiratory: No acute distress, no rhonchi, wheezing or crackles noted. No stridor or retractions are noted.   Neurological: A&O x4, normal speech  Psychiatric: Cooperative         Testing:           Medical Decision Making:     Vital signs reviewed    Past medical history reviewed. Allergies reviewed. Medications reviewed. Patient on arrival does not appear to be in any apparent distress or discomfort. The patient has been seen and evaluated. The patient does not appear to be toxic or lethargic. The patient will be treated with Omnicef and prednisone. The patient was educated on the proper dosage of motrin and tylenol and the appropriate intervals of each. The patient is to increase fluid intake over the next several days. The patient is to use OTC decongestant as needed. The patient is to return to express care or go directly to the emergency department should any of the signs or symptoms worsen. The patient is to followup with primary care physician in 2-3 days for repeat evaluation. The patient has no other questions or concerns at this time the patient will be discharged home. Clinical Impression:   Annie was seen today for nasal congestion and pharyngitis. Diagnoses and all orders for this visit:    Acute non-recurrent sinusitis, unspecified location    Postnasal drip    Pain in throat    Nasal congestion    Other orders  -     cefdinir (OMNICEF) 300 MG capsule; Take 1 capsule by mouth 2 times daily for 10 days  -     predniSONE (DELTASONE) 10 MG tablet; 3 tabs once daily for 3 days, 2 tabs once daily for 3 days, 1 tab once daily for 3 days        The patient is to call for any concerns or return if any of the signs or symptoms worsen. The patient is to follow-up with PCP in the next 2-3 days for repeat evaluation repeat assessment or go directly to the emergency department.      SIGNATURE: Darwin Cooper III, PA-C